# Patient Record
Sex: MALE | Race: WHITE | URBAN - METROPOLITAN AREA
[De-identification: names, ages, dates, MRNs, and addresses within clinical notes are randomized per-mention and may not be internally consistent; named-entity substitution may affect disease eponyms.]

---

## 2019-08-20 LAB
ABS NEUT (OLG): 5.41 X10(3)/MCL (ref 2.1–9.2)
BASOPHILS # BLD AUTO: 0 X10(3)/MCL (ref 0–0.2)
BASOPHILS NFR BLD AUTO: 1 %
BUN SERPL-MCNC: 10 MG/DL (ref 7–18)
CALCIUM SERPL-MCNC: 8.5 MG/DL (ref 8.5–10.1)
CHLORIDE SERPL-SCNC: 104 MMOL/L (ref 98–107)
CO2 SERPL-SCNC: 27 MMOL/L (ref 21–32)
CREAT SERPL-MCNC: 1.63 MG/DL (ref 0.7–1.3)
CREAT/UREA NIT SERPL: 6.1
EOSINOPHIL # BLD AUTO: 0.2 X10(3)/MCL (ref 0–0.9)
EOSINOPHIL NFR BLD AUTO: 2 %
ERYTHROCYTE [DISTWIDTH] IN BLOOD BY AUTOMATED COUNT: 13.4 % (ref 11.5–17)
GLUCOSE SERPL-MCNC: 90 MG/DL (ref 74–106)
HCT VFR BLD AUTO: 41 % (ref 42–52)
HGB BLD-MCNC: 14 GM/DL (ref 14–18)
INR PPP: 1.1 (ref 0–1.3)
LYMPHOCYTES # BLD AUTO: 2.1 X10(3)/MCL (ref 0.6–4.6)
LYMPHOCYTES NFR BLD AUTO: 24 %
MCH RBC QN AUTO: 31.8 PG (ref 27–31)
MCHC RBC AUTO-ENTMCNC: 34.1 GM/DL (ref 33–36)
MCV RBC AUTO: 93.2 FL (ref 80–94)
MONOCYTES # BLD AUTO: 0.8 X10(3)/MCL (ref 0.1–1.3)
MONOCYTES NFR BLD AUTO: 9 %
NEUTROPHILS # BLD AUTO: 5.41 X10(3)/MCL (ref 2.1–9.2)
NEUTROPHILS NFR BLD AUTO: 64 %
PLATELET # BLD AUTO: 237 X10(3)/MCL (ref 130–400)
PMV BLD AUTO: 11 FL (ref 9.4–12.4)
POTASSIUM SERPL-SCNC: 3.7 MMOL/L (ref 3.5–5.1)
PROTHROMBIN TIME: 13.9 SECOND(S) (ref 12–14)
RBC # BLD AUTO: 4.4 X10(6)/MCL (ref 4.7–6.1)
SODIUM SERPL-SCNC: 141 MMOL/L (ref 136–145)
WBC # SPEC AUTO: 8.5 X10(3)/MCL (ref 4.5–11.5)

## 2019-08-27 ENCOUNTER — HISTORICAL (OUTPATIENT)
Dept: SURGERY | Facility: HOSPITAL | Age: 79
End: 2019-08-27

## 2019-09-26 ENCOUNTER — HISTORICAL (OUTPATIENT)
Dept: SURGERY | Facility: HOSPITAL | Age: 79
End: 2019-09-26

## 2019-09-26 LAB
BUN SERPL-MCNC: 18 MG/DL (ref 7–18)
CALCIUM SERPL-MCNC: 8.9 MG/DL (ref 8.5–10.1)
CHLORIDE SERPL-SCNC: 106 MMOL/L (ref 98–107)
CO2 SERPL-SCNC: 26 MMOL/L (ref 21–32)
CREAT SERPL-MCNC: 1.76 MG/DL (ref 0.7–1.3)
CREAT/UREA NIT SERPL: 10.2
GLUCOSE SERPL-MCNC: 124 MG/DL (ref 74–106)
HCT VFR BLD AUTO: 38.5 % (ref 42–52)
HGB BLD-MCNC: 12.9 GM/DL (ref 14–18)
INR PPP: 1.1 (ref 0–1.3)
PLATELET # BLD AUTO: 239 X10(3)/MCL (ref 130–400)
POTASSIUM SERPL-SCNC: 3.6 MMOL/L (ref 3.5–5.1)
PROTHROMBIN TIME: 14.2 SECOND(S) (ref 12–14)
SODIUM SERPL-SCNC: 140 MMOL/L (ref 136–145)

## 2019-10-03 ENCOUNTER — HISTORICAL (OUTPATIENT)
Dept: SURGERY | Facility: HOSPITAL | Age: 79
End: 2019-10-03

## 2019-10-10 ENCOUNTER — HISTORICAL (OUTPATIENT)
Dept: SURGERY | Facility: HOSPITAL | Age: 79
End: 2019-10-10

## 2019-10-22 ENCOUNTER — HISTORICAL (OUTPATIENT)
Dept: SURGERY | Facility: HOSPITAL | Age: 79
End: 2019-10-22

## 2019-12-19 ENCOUNTER — HISTORICAL (OUTPATIENT)
Dept: SURGERY | Facility: HOSPITAL | Age: 79
End: 2019-12-19

## 2019-12-19 LAB
ABS NEUT (OLG): 5.85 X10(3)/MCL (ref 2.1–9.2)
BASOPHILS # BLD AUTO: 0.1 X10(3)/MCL (ref 0–0.2)
BASOPHILS NFR BLD AUTO: 1 %
BUN SERPL-MCNC: 15 MG/DL (ref 7–18)
CALCIUM SERPL-MCNC: 8.4 MG/DL (ref 8.5–10.1)
CHLORIDE SERPL-SCNC: 109 MMOL/L (ref 98–107)
CO2 SERPL-SCNC: 24 MMOL/L (ref 21–32)
CREAT SERPL-MCNC: 1.6 MG/DL (ref 0.7–1.3)
CREAT/UREA NIT SERPL: 9.4
EOSINOPHIL # BLD AUTO: 0.2 X10(3)/MCL (ref 0–0.9)
EOSINOPHIL NFR BLD AUTO: 3 %
ERYTHROCYTE [DISTWIDTH] IN BLOOD BY AUTOMATED COUNT: 13.4 % (ref 11.5–17)
GLUCOSE SERPL-MCNC: 83 MG/DL (ref 74–106)
HCT VFR BLD AUTO: 36.8 % (ref 42–52)
HGB BLD-MCNC: 12.5 GM/DL (ref 14–18)
INR PPP: 1.1 (ref 0–1.3)
LYMPHOCYTES # BLD AUTO: 1.8 X10(3)/MCL (ref 0.6–4.6)
LYMPHOCYTES NFR BLD AUTO: 20 %
MCH RBC QN AUTO: 32.4 PG (ref 27–31)
MCHC RBC AUTO-ENTMCNC: 34 GM/DL (ref 33–36)
MCV RBC AUTO: 95.3 FL (ref 80–94)
MONOCYTES # BLD AUTO: 0.7 X10(3)/MCL (ref 0.1–1.3)
MONOCYTES NFR BLD AUTO: 8 %
NEUTROPHILS # BLD AUTO: 5.85 X10(3)/MCL (ref 2.1–9.2)
NEUTROPHILS NFR BLD AUTO: 67 %
PLATELET # BLD AUTO: 193 X10(3)/MCL (ref 130–400)
PMV BLD AUTO: 10.9 FL (ref 9.4–12.4)
POTASSIUM SERPL-SCNC: 3.5 MMOL/L (ref 3.5–5.1)
PROTHROMBIN TIME: 14.8 SECOND(S) (ref 12–14)
RBC # BLD AUTO: 3.86 X10(6)/MCL (ref 4.7–6.1)
SODIUM SERPL-SCNC: 141 MMOL/L (ref 136–145)
WBC # SPEC AUTO: 8.7 X10(3)/MCL (ref 4.5–11.5)

## 2020-01-02 ENCOUNTER — HISTORICAL (OUTPATIENT)
Dept: SURGERY | Facility: HOSPITAL | Age: 80
End: 2020-01-02

## 2020-01-22 ENCOUNTER — HISTORICAL (OUTPATIENT)
Dept: PREADMISSION TESTING | Facility: HOSPITAL | Age: 80
End: 2020-01-22

## 2020-01-22 LAB
ABS NEUT (OLG): 5.38 X10(3)/MCL (ref 2.1–9.2)
BASOPHILS # BLD AUTO: 0.1 X10(3)/MCL (ref 0–0.2)
BASOPHILS NFR BLD AUTO: 1 %
BUN SERPL-MCNC: 17 MG/DL (ref 7–18)
CALCIUM SERPL-MCNC: 8.8 MG/DL (ref 8.5–10.1)
CHLORIDE SERPL-SCNC: 107 MMOL/L (ref 98–107)
CO2 SERPL-SCNC: 25 MMOL/L (ref 21–32)
CREAT SERPL-MCNC: 1.61 MG/DL (ref 0.7–1.3)
CREAT/UREA NIT SERPL: 10.6
EOSINOPHIL # BLD AUTO: 0.2 X10(3)/MCL (ref 0–0.9)
EOSINOPHIL NFR BLD AUTO: 2 %
ERYTHROCYTE [DISTWIDTH] IN BLOOD BY AUTOMATED COUNT: 12.5 % (ref 11.5–17)
GLUCOSE SERPL-MCNC: 108 MG/DL (ref 74–106)
HCT VFR BLD AUTO: 43 % (ref 42–52)
HGB BLD-MCNC: 14.4 GM/DL (ref 14–18)
INR PPP: 1.1 (ref 0–1.3)
LYMPHOCYTES # BLD AUTO: 2.3 X10(3)/MCL (ref 0.6–4.6)
LYMPHOCYTES NFR BLD AUTO: 27 %
MCH RBC QN AUTO: 32.1 PG (ref 27–31)
MCHC RBC AUTO-ENTMCNC: 33.5 GM/DL (ref 33–36)
MCV RBC AUTO: 96 FL (ref 80–94)
MONOCYTES # BLD AUTO: 0.7 X10(3)/MCL (ref 0.1–1.3)
MONOCYTES NFR BLD AUTO: 8 %
NEUTROPHILS # BLD AUTO: 5.38 X10(3)/MCL (ref 2.1–9.2)
NEUTROPHILS NFR BLD AUTO: 62 %
PLATELET # BLD AUTO: 248 X10(3)/MCL (ref 130–400)
PMV BLD AUTO: 10.7 FL (ref 9.4–12.4)
POTASSIUM SERPL-SCNC: 4 MMOL/L (ref 3.5–5.1)
PROTHROMBIN TIME: 14.2 SECOND(S) (ref 12–14)
RBC # BLD AUTO: 4.48 X10(6)/MCL (ref 4.7–6.1)
SODIUM SERPL-SCNC: 142 MMOL/L (ref 136–145)
WBC # SPEC AUTO: 8.7 X10(3)/MCL (ref 4.5–11.5)

## 2020-01-30 ENCOUNTER — HISTORICAL (OUTPATIENT)
Dept: SURGERY | Facility: HOSPITAL | Age: 80
End: 2020-01-30

## 2020-02-13 ENCOUNTER — HISTORICAL (OUTPATIENT)
Dept: SURGERY | Facility: HOSPITAL | Age: 80
End: 2020-02-13

## 2020-05-29 LAB
ABS NEUT (OLG): 3.51 X10(3)/MCL (ref 2.1–9.2)
BASOPHILS # BLD AUTO: 0.1 X10(3)/MCL (ref 0–0.2)
BASOPHILS NFR BLD AUTO: 1 %
BUN SERPL-MCNC: 14.9 MG/DL (ref 8.4–25.7)
CALCIUM SERPL-MCNC: 8.9 MG/DL (ref 8.8–10)
CHLORIDE SERPL-SCNC: 103 MMOL/L (ref 98–107)
CO2 SERPL-SCNC: 26 MMOL/L (ref 23–31)
CREAT SERPL-MCNC: 1.71 MG/DL (ref 0.73–1.18)
CREAT/UREA NIT SERPL: 9
EOSINOPHIL # BLD AUTO: 0.2 X10(3)/MCL (ref 0–0.9)
EOSINOPHIL NFR BLD AUTO: 3 %
ERYTHROCYTE [DISTWIDTH] IN BLOOD BY AUTOMATED COUNT: 13.7 % (ref 11.5–17)
GLUCOSE SERPL-MCNC: 108 MG/DL (ref 82–115)
HCT VFR BLD AUTO: 38.8 % (ref 42–52)
HGB BLD-MCNC: 12.9 GM/DL (ref 14–18)
INR PPP: 1.2 (ref 0–1.3)
LYMPHOCYTES # BLD AUTO: 2.3 X10(3)/MCL (ref 0.6–4.6)
LYMPHOCYTES NFR BLD AUTO: 34 %
MCH RBC QN AUTO: 32.3 PG (ref 27–31)
MCHC RBC AUTO-ENTMCNC: 33.2 GM/DL (ref 33–36)
MCV RBC AUTO: 97.2 FL (ref 80–94)
MONOCYTES # BLD AUTO: 0.7 X10(3)/MCL (ref 0.1–1.3)
MONOCYTES NFR BLD AUTO: 10 %
NEUTROPHILS # BLD AUTO: 3.51 X10(3)/MCL (ref 2.1–9.2)
NEUTROPHILS NFR BLD AUTO: 51 %
PLATELET # BLD AUTO: 230 X10(3)/MCL (ref 130–400)
PMV BLD AUTO: 10.8 FL (ref 9.4–12.4)
POTASSIUM SERPL-SCNC: 4 MMOL/L (ref 3.5–5.1)
PROTHROMBIN TIME: 14.2 SECOND(S) (ref 11.1–13.7)
RBC # BLD AUTO: 3.99 X10(6)/MCL (ref 4.7–6.1)
SODIUM SERPL-SCNC: 139 MMOL/L (ref 136–145)
WBC # SPEC AUTO: 6.8 X10(3)/MCL (ref 4.5–11.5)

## 2020-06-02 ENCOUNTER — HISTORICAL (OUTPATIENT)
Dept: SURGERY | Facility: HOSPITAL | Age: 80
End: 2020-06-02

## 2020-06-02 LAB — SARS-COV-2 RNA RESP QL NAA+PROBE: NOT DETECTED

## 2020-09-16 ENCOUNTER — HISTORICAL (OUTPATIENT)
Dept: INFUSION THERAPY | Facility: HOSPITAL | Age: 80
End: 2020-09-16

## 2020-10-16 ENCOUNTER — HISTORICAL (OUTPATIENT)
Dept: INFUSION THERAPY | Facility: HOSPITAL | Age: 80
End: 2020-10-16

## 2020-11-16 ENCOUNTER — HISTORICAL (OUTPATIENT)
Dept: INFUSION THERAPY | Facility: HOSPITAL | Age: 80
End: 2020-11-16

## 2020-12-16 ENCOUNTER — HISTORICAL (OUTPATIENT)
Dept: INFUSION THERAPY | Facility: HOSPITAL | Age: 80
End: 2020-12-16

## 2021-01-18 ENCOUNTER — HISTORICAL (OUTPATIENT)
Dept: INFUSION THERAPY | Facility: HOSPITAL | Age: 81
End: 2021-01-18

## 2021-01-28 LAB
ABS NEUT (OLG): 4.28 X10(3)/MCL (ref 2.1–9.2)
BASOPHILS # BLD AUTO: 0 X10(3)/MCL (ref 0–0.2)
BASOPHILS NFR BLD AUTO: 1 %
BUN SERPL-MCNC: 13 MG/DL (ref 8.4–25.7)
CALCIUM SERPL-MCNC: 9.1 MG/DL (ref 8.8–10)
CHLORIDE SERPL-SCNC: 103 MMOL/L (ref 98–107)
CO2 SERPL-SCNC: 26 MMOL/L (ref 23–31)
CREAT SERPL-MCNC: 1.57 MG/DL (ref 0.73–1.18)
CREAT/UREA NIT SERPL: 8
EOSINOPHIL # BLD AUTO: 0.2 X10(3)/MCL (ref 0–0.9)
EOSINOPHIL NFR BLD AUTO: 2 %
ERYTHROCYTE [DISTWIDTH] IN BLOOD BY AUTOMATED COUNT: 12.7 % (ref 11.5–17)
GLUCOSE SERPL-MCNC: 101 MG/DL (ref 82–115)
HCT VFR BLD AUTO: 41.3 % (ref 42–52)
HGB BLD-MCNC: 14.2 GM/DL (ref 14–18)
LYMPHOCYTES # BLD AUTO: 2 X10(3)/MCL (ref 0.6–4.6)
LYMPHOCYTES NFR BLD AUTO: 28 %
MCH RBC QN AUTO: 32.9 PG (ref 27–31)
MCHC RBC AUTO-ENTMCNC: 34.4 GM/DL (ref 33–36)
MCV RBC AUTO: 95.8 FL (ref 80–94)
MONOCYTES # BLD AUTO: 0.6 X10(3)/MCL (ref 0.1–1.3)
MONOCYTES NFR BLD AUTO: 9 %
NEUTROPHILS # BLD AUTO: 4.28 X10(3)/MCL (ref 2.1–9.2)
NEUTROPHILS NFR BLD AUTO: 60 %
PLATELET # BLD AUTO: 222 X10(3)/MCL (ref 130–400)
PMV BLD AUTO: 11.3 FL (ref 9.4–12.4)
POTASSIUM SERPL-SCNC: 3.9 MMOL/L (ref 3.5–5.1)
RBC # BLD AUTO: 4.31 X10(6)/MCL (ref 4.7–6.1)
SODIUM SERPL-SCNC: 138 MMOL/L (ref 136–145)
WBC # SPEC AUTO: 7.1 X10(3)/MCL (ref 4.5–11.5)

## 2021-02-02 ENCOUNTER — HISTORICAL (OUTPATIENT)
Dept: SURGERY | Facility: HOSPITAL | Age: 81
End: 2021-02-02

## 2021-02-08 ENCOUNTER — HISTORICAL (OUTPATIENT)
Dept: ADMINISTRATIVE | Facility: HOSPITAL | Age: 81
End: 2021-02-08

## 2021-02-18 ENCOUNTER — HISTORICAL (OUTPATIENT)
Dept: INFUSION THERAPY | Facility: HOSPITAL | Age: 81
End: 2021-02-18

## 2021-03-04 LAB
ABS NEUT (OLG): 4.76 X10(3)/MCL (ref 2.1–9.2)
BASOPHILS # BLD AUTO: 0.1 X10(3)/MCL (ref 0–0.2)
BASOPHILS NFR BLD AUTO: 1 %
BUN SERPL-MCNC: 14.3 MG/DL (ref 8.4–25.7)
CALCIUM SERPL-MCNC: 8.8 MG/DL (ref 8.8–10)
CHLORIDE SERPL-SCNC: 104 MMOL/L (ref 98–107)
CO2 SERPL-SCNC: 27 MMOL/L (ref 23–31)
CREAT SERPL-MCNC: 1.67 MG/DL (ref 0.73–1.18)
CREAT/UREA NIT SERPL: 9
EOSINOPHIL # BLD AUTO: 0.2 X10(3)/MCL (ref 0–0.9)
EOSINOPHIL NFR BLD AUTO: 2 %
ERYTHROCYTE [DISTWIDTH] IN BLOOD BY AUTOMATED COUNT: 13.2 % (ref 11.5–17)
GLUCOSE SERPL-MCNC: 86 MG/DL (ref 82–115)
HCT VFR BLD AUTO: 41.2 % (ref 42–52)
HGB BLD-MCNC: 14.1 GM/DL (ref 14–18)
LYMPHOCYTES # BLD AUTO: 2.2 X10(3)/MCL (ref 0.6–4.6)
LYMPHOCYTES NFR BLD AUTO: 28 %
MCH RBC QN AUTO: 33.4 PG (ref 27–31)
MCHC RBC AUTO-ENTMCNC: 34.2 GM/DL (ref 33–36)
MCV RBC AUTO: 97.6 FL (ref 80–94)
MONOCYTES # BLD AUTO: 0.8 X10(3)/MCL (ref 0.1–1.3)
MONOCYTES NFR BLD AUTO: 10 %
NEUTROPHILS # BLD AUTO: 4.76 X10(3)/MCL (ref 2.1–9.2)
NEUTROPHILS NFR BLD AUTO: 59 %
PLATELET # BLD AUTO: 230 X10(3)/MCL (ref 130–400)
PMV BLD AUTO: 11.1 FL (ref 9.4–12.4)
POTASSIUM SERPL-SCNC: 4.4 MMOL/L (ref 3.5–5.1)
RBC # BLD AUTO: 4.22 X10(6)/MCL (ref 4.7–6.1)
SARS-COV-2 RNA RESP QL NAA+PROBE: NOT DETECTED
SODIUM SERPL-SCNC: 140 MMOL/L (ref 136–145)
WBC # SPEC AUTO: 8 X10(3)/MCL (ref 4.5–11.5)

## 2021-03-10 ENCOUNTER — HISTORICAL (OUTPATIENT)
Dept: SURGERY | Facility: HOSPITAL | Age: 81
End: 2021-03-10

## 2021-03-10 LAB — EST CREAT CLEARANCE SER (OHS): 37.96 ML/MIN

## 2022-04-10 ENCOUNTER — HISTORICAL (OUTPATIENT)
Dept: ADMINISTRATIVE | Facility: HOSPITAL | Age: 82
End: 2022-04-10

## 2022-04-28 VITALS
DIASTOLIC BLOOD PRESSURE: 84 MMHG | BODY MASS INDEX: 22.47 KG/M2 | WEIGHT: 175.06 LBS | HEIGHT: 74 IN | SYSTOLIC BLOOD PRESSURE: 144 MMHG

## 2022-04-30 NOTE — OP NOTE
Patient:   Apollo Allan            MRN: [id number]            FIN: [id number]               Age:   80 years     Sex:  Male     :  1940   Associated Diagnoses:   None   Author:   Dylon Hartmann MD      DATE OF SURGERY:    2021    SURGEON:  Dylon Hartmann MD    PREOPERATIVE DIAGNOSIS:  Chronic pain syndrome.    POSTOPERATIVE DIAGNOSE:  Chronic pain syndrome.    PROCEDURE PERFORMED:  Fluoroscopically-guided placement of 2 spinal cord stimulator leads under anesthesia for programming and trial.    EQUIPMENT USED:  [employer] stimulator kit.    DETAILED DESCRIPTION:  Following informed consent, and with the patient under general endotracheal anesthesia, he was prepped and draped in the usual sterile fashion.  I infiltrated the tissue overlying L2-3 with local anesthetic.  Under fluoroscopic guidance, I entered the epidural space at T12-L1 using two 14-gauge Touhy curved-tip spinal needles.  I introduced stimulator lead and advanced it to the top of T8 and then a 2nd stimulator lead to the top of T9.  I carefully removed the stylets and needles while keeping the lead in place.  The leads were then connected to the generator for programming.  Everything was secured with sterile dressing.  The patient tolerated the procedure well without apparent complication.      ______________________________  Dylon Hartmann MD

## 2022-04-30 NOTE — OP NOTE
Patient:   Apollo Allan            MRN: [id number]            FIN: [id number]               Age:   79 years     Sex:  Male     :  1940   Associated Diagnoses:   None   Author:   Dylon Hartmann MD      DATE OF SURGERY:    10/03/2019    SURGEON:  Dylon Hartmann MD    PREOPERATIVE DIAGNOSIS:  Lumbar radiculopathy.    POSTOPERATIVE DIAGNOSIS:  Lumbar radiculopathy.    PROCEDURE PERFORMED:  Fluoroscopically-guided transforaminal lumbar epidural injections at left L5-S1.    EQUIPMENT USED:  Epidural tray.    DESCRIPTION OF PROCEDURE:  Following informed consent, the patient was prepped and draped in the usual sterile fashion.  I infiltrated the tissue overlying L5-S1 with local anesthetic.  Under fluoroscopic guidance, I advanced a 22-gauge 3.5 inch BD spinal needle into the epidural space via left transforaminal approach.  Positioning was confirmed with administration of contrast.  I then instilled a combination of 160 mg Depo-Medrol and 1 mL of 5% dextrose in water.  I removed the needle and applied sterile dressing.  The patient tolerated the procedure well without apparent complication.    IMPRESSION:  Successful fluoroscopically-guided transforaminal lumbar epidural injection at left L5-S1.

## 2022-04-30 NOTE — OP NOTE
Patient:   Apollo Allan            MRN: [id number]            FIN: [id number]               Age:   79 years     Sex:  Male     :  1940   Associated Diagnoses:   None   Author:   Dylon Hartmann MD      DATE OF SURGERY:    2019    SURGEON:  Dylon Hartmann MD    PREOPERATIVE DIAGNOSIS:  Lumbar radiculopathy.    POSTOPERATIVE DIAGNOSIS:  Lumbar radiculopathy.    PROCEDURE PERFORMED:  Fluoroscopically-guided transforaminal lumbar epidural injections at left L5-S1.    EQUIPMENT USED:  Epidural tray.    DESCRIPTION OF PROCEDURE:  Following informed consent, the patient was prepped and draped in the usual sterile fashion.  I infiltrated the tissue overlying L5-S1 with local anesthetic.  Under fluoroscopic guidance, I advanced a 22-gauge 3.5 inch BD spinal needle into the epidural space via left transforaminal approach.  Positioning was confirmed with administration of contrast.  I then instilled a combination of 160 mg Depo-Medrol and 1 mL of 5% dextrose in water.  I removed the needle and applied sterile dressing.  The patient tolerated the procedure well without apparent complication.    IMPRESSION:  Successful fluoroscopically-guided transforaminal lumbar epidural injection at left L5-S1.

## 2022-04-30 NOTE — OP NOTE
Patient:   Apollo Allan            MRN: [id number]            FIN: [id number]               Age:   79 years     Sex:  Male     :  1940   Associated Diagnoses:   None   Author:   Dylon Hartmann MD      DATE OF SURGERY:    2019    SURGEON:  Dylon Hartmann MD    PREOPERATIVE DIAGNOSIS:  Lumbar spondylosis.    POSTOPERATIVE DIAGNOSIS:  Lumbar spondylosis.    PROCEDURE PERFORMED:  Fluoroscopically-guided medial branch blocks at bilateral L5, S1.    EQUIPMENT USED:  Epidural tray.    DESCRIPTION OF PROCEDURE:  Following informed consent, the patient was prepped and draped in the usual sterile fashion.  I infiltrated the tissue overlying L5 and S1 with local anesthetic.  Under fluoroscopic guidance, I advanced four 22-gauge 3.5 inch BD spinal needles, performing medial branch blocks using 160mg 2% lidocaine.  I removed the needles and applied sterile dressings.  The patient tolerated the procedure well without apparent complication.    IMPRESSION:  Successful fluoroscopically-guided medial branch blocks at bilateral L5, S1.

## 2022-04-30 NOTE — OP NOTE
Patient:   Apollo Allan            MRN: [id number]            FIN: [id number]               Age:   79 years     Sex:  Male     :  1940   Associated Diagnoses:   None   Author:   Dylon Hartmann MD      DATE OF SURGERY:    2020    SURGEON:  Dylon Hartmann MD    PREOPERATIVE DIAGNOSIS:  Lumbar spondylosis.    POSTOPERATIVE DIAGNOSIS:  Lumbar spondylosis.    PROCEDURE PERFORMED:  Fluoroscopically-guided medial branch blocks at bilateral L3, L4, L5, S1.    EQUIPMENT USED:  Epidural tray.    DESCRIPTION OF PROCEDURE:  Following informed consent, the patient was prepped and draped in the usual sterile fashion.  I infiltrated the tissue overlying L3, L4, L5, S1 with local anesthetic.  Under fluoroscopic guidance, I advanced eight 22-gauge 3.5 inch BD spinal needles, performing medial branch blocks using 320mg 2% lidocaine.  I removed the needles and applied sterile dressings.  The patient tolerated the procedure well without apparent complication.    IMPRESSION:  Successful fluoroscopically-guided medial branch blocks at bilateral L3, L4, L5, S1.

## 2022-04-30 NOTE — OP NOTE
Patient:   Apollo Allan            MRN: [id number]            FIN: [id number]               Age:   79 years     Sex:  Male     :  1940   Associated Diagnoses:   None   Author:   Dylon Hartmann MD      DATE OF SURGERY:    19    SURGEON:  Dylon Hartmann MD    PREOPERATIVE DIAGNOSIS:  T12, L1 compression fractures.    POSTOPERATIVE DIAGNOSIS:  T12, L1 compression fractures.    PROCEDURES PERFORMED:    1. Kyphoplasty at T12, L1  2. Bone biopsy at T12.    EQUIPMENT USED:  [employer] kyphoplasty tray.    DESCRIPTION OF PROCEDURE:  Following informed consent, and with the patient under general endotracheal anesthesia, she was prepped and draped in the usual sterile fashion.  The T12 and L1 vertebral bodies was located under fluoroscopy.  Incisions were made over the eft aspect of the vertebral bodies.  Trocars were advanced via left transpedicular approaches to the posterior third of each vertebral body.  Bone biopsy was performed at T12.  Drills were inserted and advanced to the anterior third of each vertebral body.  These were removed.  Balloons were then inserted and inflated to a maximum pressure of 100 psi at T12 and 200 psi at L1.  These were deflated and removed.  Bone cement was prepared and 5.5 mL were placed at L1 and 4.5 mL were placed at T12  The cannulae were carefully removed.  Final fluoroscopic AP and lateral views demonstrated good cement filling.  The patient tolerated the procedure well without apparent complication.      ______________________________  Dylon Hartmann MD

## 2022-04-30 NOTE — OP NOTE
Patient:   Apollo Allan            MRN: [id number]            FIN: [id number]               Age:   80 years     Sex:  Male     :  1940   Associated Diagnoses:   None   Author:   Dylon Hartmann MD      DATE OF SURGERY:    20    SURGEON:  Dylon Hartmann MD    PREOPERATIVE DIAGNOSIS:  T11 and L3 compression fractures.    POSTOPERATIVE DIAGNOSIS:  T11 and L3 compression fractures.    PROCEDURES PERFORMED:    1. Bilateral kyphoplasty at T11 and L3.  2. Bone biopsy at T11.    EQUIPMENT USED:  [employer] kyphoplasty tray.    DESCRIPTION OF PROCEDURE:  Following informed consent, and with the patient under general endotracheal anesthesia, she was prepped and draped in the usual sterile fashion.  The T11 vertebral body was located under fluoroscopy.  Incisions were made over the right and left aspects of the vertebral body.  Trocars were advanced bilaterally via transpedicular approaches to the posterior third of the vertebral body.   Drills were inserted bilaterally and advanced to the anterior third of the vertebral body.   Bone biopsy was performed via the drill on the left.   The L3 vertebral body was located under fluoroscopy.  Incisions were made over the right and left aspects of the vertebral body.  Trocars were advanced bilaterally via transpedicular approaches to the posterior third of the vertebral body.   Drills were inserted bilaterally and advanced to the anterior third of the vertebral body.  Balloons were then inserted and inflated to a maximum pressure of 150 psi bilaterally at L3 and 250 psi bilaterally at T11.  These were deflated and removed.  Bone cement was prepared and 3 mL were placed via the right cannula and 3 mL were placed via a left cannula at T11.  4.5 mL were placed via the right cannula and 3 mL were placed via a left cannula at L3.  The cannulae were carefully removed.  Final fluoroscopic AP and lateral views demonstrated good cement filling.  The patient tolerated  the procedure well without apparent complication.      ______________________________  Dylon Hartmann MD

## 2022-04-30 NOTE — OP NOTE
Patient:   Apollo Allan            MRN: [id number]            FIN: [id number]               Age:   79 years     Sex:  Male     :  1940   Associated Diagnoses:   None   Author:   Dylon Hartmann MD      DATE OF SURGERY:    10/22/19    SURGEON:  Dylon Hartmann MD    PREOPERATIVE DIAGNOSIS:  T12 compression fracture.    POSTOPERATIVE DIAGNOSIS:  T12 compression fracture.    PROCEDURES PERFORMED:    1. T12 kyphoplasty.    EQUIPMENT USED:  "[employer]" kyphoplasty tray.    DESCRIPTION OF PROCEDURE:  Following informed consent, and with the patient under general endotracheal anesthesia, he was prepped and draped in the usual sterile fashion.  The T12 vertebral body was located under fluoroscopy.  Incision was made over the left aspect of the vertebral body.  Trocar was advanced via transpedicular approach to the posterior third of the vertebral body.  Drill was inserted and advanced to the anterior third of the vertebral body.  Balloon was then inserted and inflated to a maximum pressure of 300 psi.  Bone cement was prepared and 7.5 mL were placed.  The cannula was carefully removed.  Final fluoroscopic AP and lateral views demonstrated good cement filling.  The patient tolerated the procedure well without apparent complication.      ______________________________  Dylon Hartmann MD

## 2022-04-30 NOTE — OP NOTE
Patient:   Apollo Allan            MRN: [id number]            FIN: [id number]               Age:   79 years     Sex:  Male     :  1940   Associated Diagnoses:   None   Author:   Dylon Hartmann MD      DATE OF SURGERY:    2020    SURGEON:  Dylon Hartmann MD    PREOPERATIVE DIAGNOSIS:  Lumbar spondylosis.    POSTOPERATIVE DIAGNOSIS:  Lumbar spondylosis.    PROCEDURE:  Fluoroscopically guided radiofrequency ablation at left L3, left L4, left L5, and left S1.    PROCEDURE IN DETAIL:  Following informed consent, and with the patient under general anesthesia, he was prepped and draped in usual sterile fashion.  The skin and subcutaneous tissue were anesthetized.  Following this I used a 22 gauge angulated 150 mm exposed tip needle at each level, correctly placing them under fluoroscopic guidance at the junction of the SAP and transverse process.  After stimulating the medial branch nerve at each level without evidence of motor signs, I performed radiofrequency ablation of each level, heating the affected nerves to 80 degrees centigrade for 90 seconds.  A total of four sites were heated and treated.  I removed the needles and applied sterile dressings.  The patient tolerated the procedure well without apparent complications.    IMPRESSION:  Successful fluoroscopically guided radiofrequency ablation at left L3, left L4, left L5, and left S1.      ______________________________  Dylon Hartmann MD

## 2022-04-30 NOTE — OP NOTE
Patient:   Apollo Allan            MRN: [id number]            FIN: [id number]               Age:   79 years     Sex:  Male     :  1940   Associated Diagnoses:   None   Author:   Dylon Hartmann MD      DATE OF SURGERY:    10/10/2019    SURGEON:  Dylon Hartmann MD    PREOPERATIVE DIAGNOSIS:  Lumbar radiculopathy.    POSTOPERATIVE DIAGNOSIS:  Lumbar radiculopathy.    PROCEDURE PERFORMED:  Fluoroscopically-guided transforaminal lumbar epidural injections at left L5-S1.    EQUIPMENT USED:  Epidural tray.    DESCRIPTION OF PROCEDURE:  Following informed consent, the patient was prepped and draped in the usual sterile fashion.  I infiltrated the tissue overlying L5-S1 with local anesthetic.  Under fluoroscopic guidance, I advanced a 22-gauge 3.5 inch BD spinal needle into the epidural space via left transforaminal approach.  Positioning was confirmed with administration of contrast.  I then instilled a combination of 160 mg Depo-Medrol and 1 mL of 5% dextrose in water.  I removed the needle and applied sterile dressing.  The patient tolerated the procedure well without apparent complication.    IMPRESSION:  Successful fluoroscopically-guided transforaminal lumbar epidural injection at left L5-S1.

## 2022-04-30 NOTE — OP NOTE
Patient:   Apollo Allan            MRN: [id number]            FIN: [id number]               Age:   79 years     Sex:  Male     :  1940   Associated Diagnoses:   None   Author:   Dylon Hartmann MD      DATE OF SURGERY:    2020    SURGEON:  Dylon Hartmann MD    PREOPERATIVE DIAGNOSIS:  Lumbar spondylosis.    POSTOPERATIVE DIAGNOSIS:  Lumbar spondylosis.    PROCEDURE:  Fluoroscopically guided radiofrequency ablation at right L3, L4, L5, S1.    PROCEDURE IN DETAIL:  Following informed consent, and with the patient under general anesthesia, he was prepped and draped in usual sterile fashion.  The skin and subcutaneous tissue were anesthetized.  Following this I used a 22 gauge angulated 100 mm exposed tip needle at each level, correctly placing them under fluoroscopic guidance at the junction of the SAP and transverse process.  After stimulating the medial branch nerve at each level without evidence of motor signs, I performed radiofrequency ablation of each level, heating the affected nerves to 80 degrees centigrade for 90 seconds.  A total of four sites were heated and treated.  I removed the needles and applied sterile dressings.  The patient tolerated the procedure well without apparent complications.    IMPRESSION:  Successful fluoroscopically guided radiofrequency ablation at right L3, L4, L5, S1.

## 2022-05-03 NOTE — HISTORICAL [facility] CERNER
This is a historical note converted from [employer]. Formatting and pictures may have been removed.  Please reference Arturo for original formatting and attached multimedia. Indication for Surgery  The patient is an 81-year-old male?who presents with severe back pain.? He has had a history of multiple compression fractures treated with kyphoplasty.? He is tried consistence of conservative treat without relief.? Chronic pain syndrome.? He was subsequently had a trial?with a spinal cord stimulator and did well.? Patient presents now for implant.  Preoperative Diagnosis  Chronic pain syndrome  Postoperative Diagnosis  Same  Operation  T11 laminotomy for placement of nevro?electrode  Placement of nevro?spinal cord stimulator generator  Programming of complex spinal cord generator  Surgeon(s)  Ruy Eagle MD  Assistant  None  Anesthesia  General  Estimated Blood Loss  10 mL  Findings  None  Specimen(s)  None  Complications  None  Technique  The patient was taken to the operating room and placed on the table in a supine position. ?After instillation of general anesthetic?patient was placed in the prone position?and prepped and draped usual fashion. ?Midline was marked.? Then the area where the generator was going to be placed was marked preoperatively for good positioning?and then a linear incision was made. ?Hemostasis was assured with electrocautery.? Pocket was created with electrocautery then injected with?half percent Marcaine with epi.? Then attention was directed to the midline.? Levels were verified under fluoroscopy and then a midline incision was made over T11/T12.? Hemostasis was assured with electrocautery. ?Dissection come to the fascial planes?and a subperiosteal dissection was performed exposing the?T11-T12?space.? Grey self-retaining retractor was position?then utilizing a small Christopher rongeur then the air drill and then a 2 mm Kerrison?small laminotomy was performed at T11.? Then the epidural  dilator was inserted under fluoroscopic control?and then the?electrode was carefully inserted under fluoroscopic control?spanning T9-T10.? Once good position was confirmed a tunneler was passed between the incisions?anchors were placed over the wires and the wires were tunneled to the pocket.? Anchors were then sutured to the interspinous ligament using?3-0 silk pop-offs.? Wires were attached to the generator and tested for proper impedance.? Generator was then inserted into the pocket.? Posterior thoracic incision was injected with Marcaine also.? Self retainers were removed?and the deep muscle and fascia was reapproximated with 0 Vicryl in interrupted fashion.? Subcutaneous layers were closed at both sites with 2-0 Vicryl in running fashion?and the skin was closed with a 4 oh?strata fix suture. ?Wound was dressed with a Prineo dressing. ?Then with Telfa and tape.? The generator was programmed at the end of the case.? The patient tolerated procedure well was transferred recovery room in stable condition.